# Patient Record
(demographics unavailable — no encounter records)

---

## 2018-11-15 NOTE — PDOC.LDHP
Labor and Delivery H&P


Chief complaint: contractions


HPI: 


37 yo  @ 31w6d who presented to clinic with c/o persistent ctx that have 

decreased today. Pt has h/o PTD and is on arely for prevention. Antepartum 

course also complicated by A2DM on Metformin and alcohol exposure early in 

pregnancy as was unaware she was pregnant. 


Current gestational age (weeks): 31


Due date: 19


Dating criteria: second trimester ultrasound


Grav: 6


Para: 3


OB History Details: 


2 term SVDs (on [place]), 1   due to spontaneous PTL, 1 SAB


Abnormal US findings: No


Past Medical History: 


A2DM


Current medications: pre- vitamins, other ([place] Metformin)


Allergies/Adverse Reactions: 


 Allergies











Allergy/AdvReac Type Severity Reaction Status Date / Time


 


Penicillins Allergy   Verified 11/15/18 12:57











Social history: none





- Physical Exam


Vital signs reviewed and normal: yes


General: resting


Heart: RRR


Lungs: nonlabored breathing


Abdomen: gravid


Extremeties: no edema


FHT: category 1 (120s, mod aurea, +accels, no decels)


[place] contractions every: q4-8 min





- Vaginal Exam


cm dilated: 2 (cephalic)


Effacement: 50%


Station: -2





- OB Labs


Blood type: O


RH: positive


Antibody Screen: negative


HIV: negative


RPR: negative


HEPSAg: negative


1 hour GCT: positive


3 hour GTT: positive 


GBS: unknown


Urine drug screen: not done


Rubella: immune


Additional Labs: 


Maternity 21 and msAFP wnl 





- Assessment


31w6d IUP


PTL, h/o PTD


AMA


A2DM








- Plan


Plan: observation in L&D, GBS antibiotic prophylaxis, magnesium for 

neuroprotection (and tocolysis), informed consent obtained, other (BMTZ course 

for FLM)


-: 


 labor protocol started. 


Continue Metformin and BG checks.

## 2018-11-16 NOTE — PDOC.LDPN
Labor & Delivery Progress Note





- Subjective


Subjective: comfortable





- Objective


Vital signs reviewed and normal: yes


General: NAD, resting


Uterine fundus: non tender


FHT: category 1 (120s, mod aurea, +accels, no decels )


[place] contractions every: no ctx in 4 hrs 





- Assessment


(1) 32 weeks gestation of pregnancy


Code(s): Z3A.32 - 32 WEEKS GESTATION OF PREGNANCY   Current Visit: Yes   Status

: Acute   





(2)  labor


Code(s): O60.00 -  LABOR WITHOUT DELIVERY, UNSPECIFIED TRIMESTER   

Current Visit: Yes   Status: Acute   





(3) Gestational diabetes


Code(s): O24.419 - GESTATIONAL DIABETES MELLITUS IN PREGNANCY, UNSP CONTROL   

Current Visit: Yes   Status: Acute   





(4) AMA (advanced maternal age) multigravida 35+


Code(s): O09.529 - SUPERVISION OF ELDERLY MULTIGRAVIDA, UNSPECIFIED TRIMESTER   

Current Visit: Yes   Status: Acute   





(5) History of  delivery


Code(s): Z87.51 - PERSONAL HISTORY OF PRE-TERM LABOR   Current Visit: Yes   

Status: Acute   


-: 


s/p Mag for neuroprotection 


s/p BMTZ #2


Continue procardia for 24 hrs after 2nd dose of BMTZ to get full FLM benefit. 


Change to intermittent monitoring 


GBS+, continue Ancef until d/c


Plan for d/c home tomorrow if stable. 


Monitor BG, reviewed may be increased with BMTZ. Reviewed option of increasing 

QAM Metformin at home if needed, pt understands.

## 2018-11-16 NOTE — PDOC.LDPN
Labor & Delivery Progress Note





- Subjective


Subjective: comfortable





- Objective


Vital signs reviewed and normal: yes


General: NAD


Uterine fundus: non tender


Dilation: 3


Effacement: 50%


Station: -2


FHT: category 1 (120s, mod aurea, +accels, no decels )


[place] contractions every: q 8 min earlier, now quiescent 


Other exam findings: cephalic





- Assessment


(1) 32 weeks gestation of pregnancy


Code(s): Z3A.32 - 32 WEEKS GESTATION OF PREGNANCY   Current Visit: Yes   Status

: Acute   





(2)  labor


Code(s): O60.00 -  LABOR WITHOUT DELIVERY, UNSPECIFIED TRIMESTER   

Current Visit: Yes   Status: Acute   





(3) Gestational diabetes


Code(s): O24.419 - GESTATIONAL DIABETES MELLITUS IN PREGNANCY, UNSP CONTROL   

Current Visit: Yes   Status: Acute   





(4) AMA (advanced maternal age) multigravida 35+


Code(s): O09.529 - SUPERVISION OF ELDERLY MULTIGRAVIDA, UNSPECIFIED TRIMESTER   

Current Visit: Yes   Status: Acute   





(5) History of  delivery


Code(s): Z87.51 - PERSONAL HISTORY OF PRE-TERM LABOR   Current Visit: Yes   

Status: Acute   


-: 


Continue mag for neuroprotection to complete 24 hrs. Transition to procardia 

until 48 hr benefit. 


Complete BTZ course. 


Continue Ancef at this time. GBS pending. 


Check fasting and 2 hr PP BG due to A2DM. Continue Metformin. PRN insulin. 

Elevated BG due to BMTZ. 


Neonatology consult.

## 2018-11-17 NOTE — DIS
DATE OF ADMISSION:  11/15/2018

 

DATE OF PROJECTED DISCHARGE:  2018

 

This is a patient of Dr. Oneill.

 

PRINCIPAL DIAGNOSES:

1.  Arrested  labor at 32 weeks.

2.  Gestational diabetes.

 

PRINCIPAL PROCEDURE:

1.  Magnesium sulfate for fetal neuro protection.

2.  Steroid administration for fetal lung maturity.

3.  Labor observation.

 

HOSPITAL COURSE:  In brief, this is a 36-year-old G6, P2, who was admitted at 31 weeks and 6 days, wh
o presented to the clinic initially with a complaint of contractions.  She has a history of A2 diabet
es and has been on metformin.  She was sent over to Labor and Delivery for  labor, observation
, and steroid administration.

 

On physical exam, her cervix had been 2 cm and 50% effaced initially.  Per Dr. Oneill, the last cervi
anjum exam, which was on 2018, in the afternoon was 3 cm dilation, 50% effacement, -2 station.  T
he patient stayed in Labor and Delivery where she had a GBS prophylaxis with Ancef.  She also had [place] and magnesium sulfate for fetal neuro protection.  She was placed on Procardia on the afternoo
n of 2018 set to continue until 2018 at noon until the full 48 hours from the first injec
tion of steroid was complete steroid benefit.  The plan per Dr. Oneill was to watch the patient on [place] and Delivery on 2018 with possible discharge at 1600.  We will stop the Procardia on 2018 at noon as well as stop the antibiotics.

 

I have seen and evaluated the patient at bedside.  Plan discussed with her.  Dr. Oneill has increased
 her metformin to 1000 mg in the morning and 500 mg in the evening to cover the glucose steroid effec
t for the next week.  She will follow up with Dr. Oneill in 1 week.  I will pass this onto the [facility] physician on call.

## 2018-11-17 NOTE — PDOC.EVN
Event Note





- Event Note


Event Note: 





Ms Hammond is feeling better. It is 1600. SVE unchanged. She is comfortable 

discharging home as scheduled. Pt counselled to call her primary ob on Monday 

to schedule f/u. Fetus cat 1 tracing.

## 2018-11-17 NOTE — PDOC.EVN
Event Note





- Event Note


Event Note: 





On Call OBGYN follow up





Patient now 32 weeks 1 day, GDM, on oral procardia until today at noon for 

steroid benefit.


Dr Dubon has increased her oral metformin to 1000mg Qam, and left the pm dose 

to 500mg.





I discussed with Dr Dubon, our team will manage today and likely send home 

after 1600 today. 





Plan D/W Kimi





Patient seen at bedside


Monitor and vitals reviewed


No evidence ROM or progressive labor





DX: arrested PTL at 32 weeks


Other DX: GDM on oral metformin

## 2018-11-28 NOTE — SS
DATE OF  SERVICE:  2018 

 

REGULAR PHYSICIAN:  Juany Oneill D.O. 

 

EVALUATING PHYSICIAN:  Ortiz Rodriguez M.D.

 

CHIEF COMPLAINT:  Pelvic pressure at home.

 

HISTORY OF PRESENT ILLNESS:  Ms. Hammond is a 36-year-old white  AB1 with an estimated date of co
nfinement of 2019 who presents complaining of pelvic pressure since this morning.  She denies r
uptured membranes, vaginal bleeding or distinct uterine contractions.

 

Of note is the fact, she was admitted last week in  labor.  She was given magnesium prophylaxi
s as well as steroids for fetal lung maturity and then was sent home.  Her exam upon discharge was 3 
cm, 50%, with a vertex presenting.

 

She denies ruptured membranes or vaginal bleeding at present.

 

PAST OBSTETRICAL HISTORY:  Includes a vaginal delivery at term.  A 34-week  vaginal delivery a
s well as a 37 week induction for reported cholestasis.  She has also had a miscarriage in the past.

 

PAST MEDICAL HISTORY:  Unremarkable.

 

PAST SURGICAL HISTORY:  LEEP procedure of the cervix.

 

CURRENT MEDICATIONS:  Prenatal vitamins, metformin, and [place].

 

ALLERGIES:  PENICILLIN, which she was told as a child.

 

SOCIAL HISTORY:  She denies tobacco, alcohol, or drug use.

 

FAMILY HISTORY:  Unremarkable.

 

REVIEW OF SYSTEMS:  Denies nausea, vomiting, fever, chills, vaginal bleeding or rupture of membranes.


 

PHYSICAL EXAMINATION:

VITAL SIGNS:  Stable and she is afebrile in triage.

GENERAL:  She is pleasant and well appearing.  She is in no distress.

ABDOMEN:  Soft, nontender and gravid.  

PELVIC:  Pelvic exam shows the cervix to be 3 cm dilated, 50% effaced with a vertex ballottable.  Fet
al heart rate tracing is stable and there are no decelerations.  It is reassuring.  A single uterine 
contraction is seen over the 30-40 minutes that she is in triage.

 

ASSESSMENT:  

1.  33 and 4/7 week intrauterine pregnancy.

2.  No evidence of  labor at this time, no cervical change noted.

 

PLAN:  I have discussed the patient's exam with Dr. Oneill and the plan at this time will be to send 
her home to bed rest with  labor precautions.  She does state that she has an appointment with
 Dr. Oneill in the morning.  She voices understanding of her discharge instructions and is sent home 
in good condition.

## 2018-12-14 NOTE — ER
DATE OF SERVICE:  2018



PRIMARY OBSTETRICIAN:  Dr. Juany Oneill.



CHIEF COMPLAINT:  Abdominal pain and pelvic pressure.



HISTORY OF PRESENT ILLNESS:  The patient is a 36-year-old G6, P3 female with an

intrauterine pregnancy at 36 weeks, who is presenting to Labor and Delivery with

worsening of uterine contractions earlier prior to presentation.  The patient

reports her contraction earlier in the day has become more intense, but admits that

her contractions since have become less prolonged, and less frequent and less

intense.  The patient denies leakage of fluid or vaginal bleeding.  She denies

urinary urgency.  She denies any recent illness, fever, fall, headache, chest pain,

shortness of breath, nausea, vomiting, diarrhea, constipation.  She denies any new

rashes, hip problems, knee problems, muscle weakness, vaginal bleeding, leakage of

fluid, or urinary urgency. 



PAST MEDICAL HISTORY:  Negative.



PAST SURGICAL HISTORY:  LEEP procedure.



ALLERGIES:  PENICILLIN.



MEDICATIONS:  

1. Prenatal vitamins.

2. Metformin.

3. [place].



FAMILY HISTORY:  Unremarkable.



REVIEW OF SYSTEMS:  Per HPI.



OBSTETRIC LABORATORY DATA:  Unavailable at time of dictation.



PHYSICAL EXAMINATION:

VITAL SIGNS:  Blood pressure is 118/76, heart rate of 87, respiratory rate of 18,

and temperature 98.4. 

GENERAL:  She appears to be in no acute distress.  She is alert, oriented,

cooperative, pleasant to interact with. 

HEENT:  Head is normocephalic, atraumatic. 

LUNGS:  Clear to auscultation bilaterally. 

HEART:  Has regular rate and rhythm. 

ABDOMEN:  Soft, gravid, and nontender. 

EXTREMITIES:  Nontender and nonedematous. 

:  Vulva is without masses, lesions, or erythema.  Cervix is 4.5, 90, 0 station

unchanged after 2 hours. 



Fetal heart tracing shows baseline in the 130s with moderate long-term variability,

positive 15 x 15 accelerations, no decelerations.  Tocometer shows irritability, but

no consistent contraction patterns. 



ASSESSMENT AND PLAN:  The patient is a 36-year-old multiparous female with an

intrauterine pregnancy at 36 weeks, status post steroids about 3 weeks ago, who is

unchanged at 4.5 cm dilated, 90% effaced, and 0 station with no regular contraction

pattern.  The patient is being discharged to home.  She does have a GBS test already

__________ a couple of days ago and has been given  labor precautions. 







Job ID:  [id number]

## 2018-12-28 NOTE — PDOC.OPDEL
OB Operative/Delivery Note


Delivery Dr/Surgeon: Juany Oneill DO


Pre-Delivery Diagnosis: active labor


Procedure/Post Delivery Dx: spontaneous vaginal delivery


Weeks gestation: 38


Anesthesia: epidural





- Findings


  ** A


Sex: male


Apgar - 1 min: 9


Apgar - 5 min: 9





- Additional Findings/Plan


Placenta delivered: spontaneous


Repaired Obstetrical Laceration: 1st degree


Estimated blood loss:  cc


Compilations/Other Findings: 


Infant in cephalic presentation


Clear amniotic fluid 


Normal appearing placenta 


Post delivery plan: routine recovery

## 2018-12-28 NOTE — PDOC.LDHP
Labor and Delivery H&P


Chief complaint: contractions


HPI: 


35 yo  @ 38w0d by 16 week sono who presented to clinic with c/o ctx, found 

to be in labor. Antepartum course complicated by A2DM, on metformin, prior PTL 

requiring admission for BMTZ, h/o PTD s/p arely course and EtOH exposure in 

early pregnancy when she was unaware she was pregnant. 


Current gestational age (weeks): 38


Due date: 19


Dating criteria: second trimester ultrasound


Grav: 6


Para: 3


OB History Details: 


1 PTVD @ 36 weeks, 2 TVDs, 1 EAB, 1 SAB 


Current pregnancy complications: gestational diabetes, other (PTL)


Abnormal US findings: No


Past Medical History: 


Denies


Current medications: pre-ron vitamins, other (Metformin)


Previous surgical history: other (LEEP)


Allergies/Adverse Reactions: 


 Allergies











Allergy/AdvReac Type Severity Reaction Status Date / Time


 


Penicillins Allergy   Verified 18 13:47











Social history: alcohol use (in early pregnany, none since finding out she is 

pregnant.)





- Physical Exam


Vital signs reviewed and normal: yes


General: NAD


Heart: RRR


Lungs: nonlabored breathing


Abdomen: gravid


Extremeties: no edema


FHT: category 1


[place] contractions every: q 4-5 min in clinic on NST 





- Vaginal Exam


cm dilated: 6 (cephalic, BBOW)


Effacement: 90%


Station: 0





- OB Labs


Blood type: O


RH: positive


Antibody Screen: negative


HIV: negative


RPR: negative


HEPSAg: negative


1 hour GCT: positive


3 hour GTT: positive 


GBS: positive


Urine drug screen: negative


Rubella: immune


Additional Labs: 


NIPT, NT and msAFP wnl





- Assessment


38w0d IUP


Active labor


A2DM


H/O PTL and h/o PTD


AMA


EtOH exposure in early pregnancy 


GBS +





- Plan


Plan: admit to L&D, GBS antibiotic prophylaxis, informed consent obtained, 

anesthesia consult for pain management


-: 


Accuchecks q 2 hrs.

## 2018-12-29 NOTE — PDOC.PP
Post Partum Progress Note


Post Partum Day #: 1


Subjective: 





Patient aware of the nonopthalmia (unilateral), baby has voided. She states 

that she is thankful that it seems to be an isolated congenital anomaly.


PO intake tolerated: yes


Flatus: yes


Ambulation: yes


 Vital Signs (12 hours)











  Pulse Ox


 


 12/28/18 22:30  100








 Weight











Weight                         160 lb

















- Physical Examination


General: NAD


Cardiovascular: no m/r/g


Respiratory: clear to auscultation bilaterally


Abdominal: + bowel sounds, lochia, no distention, appropriately TTP


Extremities: negative homans (B)


Neurological: no gross focal deficits


Psychiatric: A&Ox3, normal affect


Result Diagrams: 


 12/29/18 05:32





Additional Labs: 


 Post Partum Labs











Blood Type  O POSITIVE   12/28/18  13:00    


 


Hep Bs Antigen  Non-Reactive S/CO (NonReactive)   12/28/18  13:00    











(1) AMA (advanced maternal age) multigravida 35+


Code(s): O09.529 - SUPERVISION OF ELDERLY MULTIGRAVIDA, UNSPECIFIED TRIMESTER   

Status: Acute   





(2) Gestational diabetes


Code(s): O24.419 - GESTATIONAL DIABETES MELLITUS IN PREGNANCY, UNSP CONTROL   

Status: Acute   





(3) Vaginal delivery


Code(s): O80 - ENCOUNTER FOR FULL-TERM UNCOMPLICATED DELIVERY   Status: Acute   





- Assessment/Plan





PPD 1...HX A2DM on metfromin, baby with unilateral nonopthalmia.


She concieved while on the IUD.


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


We discussed the occurrance of this anomaly..1 in 5000 per literature...baby 

hearing test pending.


Dr Dubon will see [facility] patient ina [place] (personal communication with Ling 

last PM)

## 2018-12-30 NOTE — DIS
DATE OF ADMISSION:  12/28/2018



DATE OF DISCHARGE:  12/30/2018



ADMITTING DIAGNOSES:  

1. Active labor.

2. Advanced maternal age.

3. Alcohol exposure in early pregnancy.

4. GBS positive.

5. A2 diabetes.



DISCHARGE DIAGNOSES:  

1. Active labor.

2. Advanced maternal age.

3. Alcohol exposure in early pregnancy.

4. GBS positive.

5. A2 diabetes.

6. 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



PROCEDURE:  Term spontaneous vaginal delivery.



HOSPITAL COURSE:  The patient is a 36-year-old G6, P3 female with an intrauterine

pregnancy at 38 weeks at the time of admission, who presented in active labor

resulting in a term spontaneous vaginal delivery.  For complete details, please

refer to the delivery note.  Her postpartum course has been complicated only by a

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

by neonatology.  Today is postpartum day 2.  The patient reports she is tolerating

p.o., voiding on her own, having decreased lochia, and good pain control. 



PHYSICAL EXAMINATION:

VITAL SIGNS:  Blood pressure this morning 128/83, temperature 97.8, pulse is 71,

respiratory rate 18, and saturating 100% on room air. 

GENERAL:  She appears to be in no acute distress.  She is alert and oriented,

cooperative and pleasant to interact with. 

HEENT:  Head is normocephalic, atraumatic. 

ABDOMEN:  Fundus is firm. 

EXTREMITIES:  Nontender, nonedematous.



ASSESSMENT AND PLAN:  The patient is being discharged to home on ibuprofen to be

taken as needed for pain control.  She has instructions to follow up with her

primary OB, Dr. Oneill in 6 weeks or sooner if she experiences fever, increasing

pain, or bleeding. 







Job ID:  [id number]